# Patient Record
Sex: MALE | Race: WHITE | NOT HISPANIC OR LATINO | Employment: PART TIME | URBAN - METROPOLITAN AREA
[De-identification: names, ages, dates, MRNs, and addresses within clinical notes are randomized per-mention and may not be internally consistent; named-entity substitution may affect disease eponyms.]

---

## 2021-04-12 ENCOUNTER — HOSPITAL ENCOUNTER (OUTPATIENT)
Dept: URGENT CARE | Facility: CLINIC | Age: 41
Discharge: HOME | End: 2021-04-12
Attending: FAMILY MEDICINE

## 2021-07-28 ENCOUNTER — HOSPITAL ENCOUNTER (EMERGENCY)
Facility: HOSPITAL | Age: 41
Discharge: HOME | End: 2021-07-28
Attending: EMERGENCY MEDICINE | Admitting: EMERGENCY MEDICINE

## 2021-07-28 VITALS
OXYGEN SATURATION: 97 % | DIASTOLIC BLOOD PRESSURE: 95 MMHG | SYSTOLIC BLOOD PRESSURE: 154 MMHG | BODY MASS INDEX: 32.14 KG/M2 | TEMPERATURE: 98 F | HEIGHT: 74 IN | WEIGHT: 250.44 LBS | HEART RATE: 102 BPM | RESPIRATION RATE: 18 BRPM

## 2021-07-28 DIAGNOSIS — K04.7 DENTAL ABSCESS: Primary | ICD-10-CM

## 2021-07-28 DIAGNOSIS — K08.89 PAIN, DENTAL: ICD-10-CM

## 2021-07-28 PROCEDURE — 99283 EMERGENCY DEPT VISIT LOW MDM: CPT

## 2021-07-28 RX ORDER — IBUPROFEN 400 MG/1
800 TABLET ORAL ONCE
Status: COMPLETED | OUTPATIENT
Start: 2021-07-28 | End: 2021-07-28

## 2021-07-28 RX ORDER — ACETAMINOPHEN 160 MG/5ML
500 SOLUTION ORAL ONCE
Status: DISCONTINUED | OUTPATIENT
Start: 2021-07-28 | End: 2021-07-28

## 2021-07-28 RX ORDER — LIDOCAINE HYDROCHLORIDE 20 MG/ML
10 SOLUTION OROPHARYNGEAL ONCE
Status: COMPLETED | OUTPATIENT
Start: 2021-07-28 | End: 2021-07-28

## 2021-07-28 RX ORDER — AMOXICILLIN 500 MG/1
1000 CAPSULE ORAL 3 TIMES DAILY
Qty: 30 CAPSULE | Refills: 0 | Status: SHIPPED | OUTPATIENT
Start: 2021-07-28 | End: 2021-12-28

## 2021-07-28 RX ORDER — IBUPROFEN 800 MG/1
800 TABLET ORAL EVERY 6 HOURS PRN
Qty: 30 TABLET | Refills: 0 | Status: SHIPPED | OUTPATIENT
Start: 2021-07-28 | End: 2021-12-28

## 2021-07-28 RX ORDER — ACETAMINOPHEN 160 MG/5ML
650 SOLUTION ORAL ONCE
Status: COMPLETED | OUTPATIENT
Start: 2021-07-28 | End: 2021-07-28

## 2021-07-28 RX ADMIN — LIDOCAINE HYDROCHLORIDE 10 ML: 20 SOLUTION ORAL; TOPICAL at 11:54

## 2021-07-28 RX ADMIN — IBUPROFEN 800 MG: 400 TABLET, FILM COATED ORAL at 11:50

## 2021-07-28 RX ADMIN — ACETAMINOPHEN 650 MG: 160 SOLUTION ORAL at 11:53

## 2021-07-28 RX ADMIN — BENZOCAINE 1 SPRAY: 200 SPRAY DENTAL; ORAL; PERIODONTAL at 11:53

## 2021-12-28 ENCOUNTER — OFFICE VISIT (OUTPATIENT)
Dept: FAMILY MEDICINE CLINIC | Facility: CLINIC | Age: 41
End: 2021-12-28

## 2021-12-28 VITALS
HEIGHT: 74 IN | DIASTOLIC BLOOD PRESSURE: 80 MMHG | TEMPERATURE: 99.8 F | HEART RATE: 75 BPM | SYSTOLIC BLOOD PRESSURE: 122 MMHG | RESPIRATION RATE: 16 BRPM | BODY MASS INDEX: 31.44 KG/M2 | OXYGEN SATURATION: 99 % | WEIGHT: 245 LBS

## 2021-12-28 DIAGNOSIS — F41.9 ANXIETY: ICD-10-CM

## 2021-12-28 DIAGNOSIS — G89.29 CHRONIC PAIN OF BOTH KNEES: ICD-10-CM

## 2021-12-28 DIAGNOSIS — F41.0 PANIC ATTACKS: ICD-10-CM

## 2021-12-28 DIAGNOSIS — Z00.00 ANNUAL PHYSICAL EXAM: ICD-10-CM

## 2021-12-28 DIAGNOSIS — M25.561 CHRONIC PAIN OF BOTH KNEES: ICD-10-CM

## 2021-12-28 DIAGNOSIS — Z76.89 ENCOUNTER TO ESTABLISH CARE: Primary | ICD-10-CM

## 2021-12-28 DIAGNOSIS — M25.562 CHRONIC PAIN OF BOTH KNEES: ICD-10-CM

## 2021-12-28 PROCEDURE — 99203 OFFICE O/P NEW LOW 30 MIN: CPT | Performed by: STUDENT IN AN ORGANIZED HEALTH CARE EDUCATION/TRAINING PROGRAM

## 2021-12-28 PROCEDURE — 99386 PREV VISIT NEW AGE 40-64: CPT | Performed by: STUDENT IN AN ORGANIZED HEALTH CARE EDUCATION/TRAINING PROGRAM

## 2021-12-28 RX ORDER — VENLAFAXINE HYDROCHLORIDE 37.5 MG/1
37.5 CAPSULE, EXTENDED RELEASE ORAL DAILY
Qty: 30 CAPSULE | Refills: 1 | Status: SHIPPED | OUTPATIENT
Start: 2021-12-28 | End: 2022-02-18

## 2021-12-28 RX ORDER — HYDROXYZINE HYDROCHLORIDE 25 MG/1
25 TABLET, FILM COATED ORAL 3 TIMES DAILY PRN
Qty: 30 TABLET | Refills: 1 | Status: SHIPPED | OUTPATIENT
Start: 2021-12-28 | End: 2022-08-15 | Stop reason: SDUPTHER

## 2021-12-28 NOTE — PROGRESS NOTES
"Chief Complaint  Establishing care for anxiety/knee pain and annual physical    Subjective         Asif Rosraio is a 41 y.o. male who presents to [facility]    41 years old male comes to the clinic today for annual physical and talk about worsening anxiety/bilateral chronic knee pain.    Patient currently not taking any medications.  Girlfriend present in the room    Anxiety; chronic history of anxiety, patient has tried multiple medications few years back but no medication use within last 2 years.  Patient does report uncontrolled anxiety especially after Covid and unemployment.  Patient denies any SI/HI, reports good family support.    Patient reports chronic bilateral knee pain, intermittent, work-related.  Denies any weakness/numbness or tingling.  Denies any trauma.    Denies any chest pain or shortness of breath on exertion.  Review of Systems   Objective   Vital Signs:   Vitals:    12/28/21 1030   BP: 122/80   Pulse: 75   Resp: 16   Temp: 99.8 °F (37.7 °C)   SpO2: 99%   Weight: 111 kg (245 lb)   Height: 188 cm (74\")      Body mass index is 31.46 kg/m².   Physical Exam  Vitals reviewed.   Constitutional:       Appearance: Normal appearance. He is well-developed.   HENT:      Head: Normocephalic and atraumatic.      Right Ear: External ear normal.      Left Ear: External ear normal.      Mouth/Throat:      Pharynx: No oropharyngeal exudate.   Eyes:      Conjunctiva/sclera: Conjunctivae normal.      Pupils: Pupils are equal, round, and reactive to light.   Cardiovascular:      Rate and Rhythm: Normal rate and regular rhythm.      Heart sounds: No murmur heard.  No friction rub. No gallop.    Pulmonary:      Effort: Pulmonary effort is normal.      Breath sounds: Normal breath sounds. No wheezing or rhonchi.   Abdominal:      General: Bowel sounds are normal. There is no distension.      Palpations: Abdomen is soft.      Tenderness: There is no abdominal tenderness.   Skin:     " General: Skin is warm and dry.   Neurological:      Mental Status: He is alert and oriented to person, place, and time.      Cranial Nerves: No cranial nerve deficit.   Psychiatric:         Mood and Affect: Mood and affect normal.         Behavior: Behavior normal.         Thought Content: Thought content normal.         Judgment: Judgment normal.                 Assessment and Plan   Diagnoses and all orders for this visit:    1. Encounter to establish care (Primary)  -     TSH Rfx On Abnormal To Free T4; Future  -     CBC & Differential; Future  -     Comprehensive Metabolic Panel; Future  -     Hemoglobin A1c; Future  -     Lipid Panel; Future    2. Annual physical exam  Comments:  Declined all the vaccinations.  Healthy diet and daily exercise discussed.  Orders:  -     TSH Rfx On Abnormal To Free T4; Future  -     CBC & Differential; Future  -     Comprehensive Metabolic Panel; Future  -     Hemoglobin A1c; Future  -     Lipid Panel; Future    3. Anxiety  Comments:  Has tried SSRI in the past.  Will try Effexor/hydroxyzine as needed, titrating up protocol discussed including side effects  Orders:  -     venlafaxine XR (Effexor XR) 37.5 MG 24 hr capsule; Take 1 capsule by mouth Daily.  Dispense: 30 capsule; Refill: 1  -     hydrOXYzine (ATARAX) 25 MG tablet; Take 1 tablet by mouth 3 (Three) Times a Day As Needed for Anxiety.  Dispense: 30 tablet; Refill: 1  -     TSH Rfx On Abnormal To Free T4; Future  -     CBC & Differential; Future  -     Comprehensive Metabolic Panel; Future  -     Hemoglobin A1c; Future  -     Lipid Panel; Future    4. Chronic pain of both knees  Comments:  Knee brace, home physical therapy discussed, work modifications and diclofenac reviewed/prescribed  Orders:  -     diclofenac (VOLTAREN) 50 MG EC tablet; Take 1 tablet by mouth 2 (Two) Times a Day As Needed (knee pain).  Dispense: 30 tablet; Refill: 2    5. Panic attacks  Comments:  Hydroxyzine as needed      Healthy diet and daily  exercise discussed.      Follow Up   Return in about 3 months (around 3/28/2022) for Recheck.  Patient was given instructions and counseling regarding his condition or for health maintenance advice. Please see specific information pulled into the AVS if appropriate.

## 2022-01-27 PROCEDURE — U0004 COV-19 TEST NON-CDC HGH THRU: HCPCS | Performed by: FAMILY MEDICINE

## 2022-01-28 ENCOUNTER — TELEPHONE (OUTPATIENT)
Dept: URGENT CARE | Facility: CLINIC | Age: 42
End: 2022-01-28

## 2022-02-08 ENCOUNTER — APPOINTMENT (OUTPATIENT)
Dept: GENERAL RADIOLOGY | Facility: HOSPITAL | Age: 42
End: 2022-02-08

## 2022-02-08 ENCOUNTER — HOSPITAL ENCOUNTER (EMERGENCY)
Facility: HOSPITAL | Age: 42
Discharge: HOME | End: 2022-02-08
Attending: EMERGENCY MEDICINE | Admitting: EMERGENCY MEDICINE

## 2022-02-08 VITALS
BODY MASS INDEX: 31.63 KG/M2 | HEIGHT: 75 IN | OXYGEN SATURATION: 98 % | RESPIRATION RATE: 20 BRPM | HEART RATE: 105 BPM | TEMPERATURE: 98 F | SYSTOLIC BLOOD PRESSURE: 131 MMHG | DIASTOLIC BLOOD PRESSURE: 86 MMHG | WEIGHT: 254.41 LBS

## 2022-02-08 DIAGNOSIS — S39.012A STRAIN OF LUMBAR REGION, INITIAL ENCOUNTER: ICD-10-CM

## 2022-02-08 DIAGNOSIS — S29.012A STRAIN OF MID-BACK, INITIAL ENCOUNTER: ICD-10-CM

## 2022-02-08 DIAGNOSIS — S16.1XXA STRAIN OF NECK MUSCLE, INITIAL ENCOUNTER: Primary | ICD-10-CM

## 2022-02-08 PROCEDURE — 99283 EMERGENCY DEPT VISIT LOW MDM: CPT

## 2022-02-08 PROCEDURE — 72100 X-RAY EXAM L-S SPINE 2/3 VWS: CPT

## 2022-02-08 PROCEDURE — 72072 X-RAY EXAM THORAC SPINE 3VWS: CPT

## 2022-02-08 PROCEDURE — 72050 X-RAY EXAM NECK SPINE 4/5VWS: CPT

## 2022-02-08 RX ORDER — IBUPROFEN 400 MG/1
800 TABLET ORAL ONCE
Status: COMPLETED | OUTPATIENT
Start: 2022-02-08 | End: 2022-02-08

## 2022-02-08 RX ADMIN — IBUPROFEN 800 MG: 400 TABLET, FILM COATED ORAL at 18:11

## 2022-02-08 NOTE — ED PROVIDER NOTES
Subjective   Pt states he had a work place injury a few days ago. 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  He states now as of today, has had neck, mid and lower back pain. Wants to have imaging done.       History provided by:  Patient  Fall  Mechanism of injury: fall    Injury location:  Head/neck (back)  Incident location:  Around machinery  Time since incident:  3 days  Associated symptoms: back pain and neck pain        Review of Systems   Constitutional: Negative for appetite change, chills, fatigue and fever.   HENT: Negative.    Eyes: Negative.  Negative for photophobia.   Respiratory: Negative.    Gastrointestinal: Negative.    Endocrine: Negative.    Genitourinary: Negative.    Musculoskeletal: Positive for back pain and neck pain.   Skin: Negative.    Allergic/Immunologic: Negative.  Negative for immunocompromised state.   Neurological: Negative.    Hematological: Negative.    Psychiatric/Behavioral: Negative.    All other systems reviewed and are negative.      Past Medical History:   Diagnosis Date   • Anxiety    • Depression    • Forgetfulness    • GERD (gastroesophageal reflux disease)    • Hemorrhoids    • Migraines    • Night sweats        No Known Allergies    History reviewed. No pertinent surgical history.    Family History   Problem Relation Age of Onset   • No Known Problems Mother    • No Known Problems Father        Social History     Socioeconomic History   • Marital status: Legally    Tobacco Use   • Smoking status: Current Every Day Smoker     Packs/day: 1.00     Years: 15.00     Pack years: 15.00   • Smokeless tobacco: Never Used   Vaping Use   • Vaping Use: Never used   Substance and Sexual Activity   • Alcohol use: Not Currently   • Drug use: Never   • Sexual activity: Yes     Partners: Female     Birth control/protection: Condom           Objective   Physical Exam  Vitals and  nursing note reviewed.   Constitutional:       General: He is not in acute distress.     Appearance: Normal appearance. He is not toxic-appearing.   HENT:      Head: Normocephalic and atraumatic.      Mouth/Throat:      Mouth: Mucous membranes are moist.   Eyes:      General: No scleral icterus.  Cardiovascular:      Rate and Rhythm: Normal rate and regular rhythm.      Pulses: Normal pulses.      Heart sounds: Normal heart sounds.   Pulmonary:      Effort: Pulmonary effort is normal. No respiratory distress.      Breath sounds: Normal breath sounds.   Musculoskeletal:         General: Normal range of motion.      Cervical back: Normal range of motion and neck supple.        Back:       Comments: Pain along all paraspinal tenderness on all levels  No saddle anesthesia   Skin:     General: Skin is warm and dry.   Neurological:      Mental Status: He is alert and oriented to person, place, and time. Mental status is at baseline.           MDM  Number of Diagnoses or Management Options  Strain of lumbar region, initial encounter  Strain of mid-back, initial encounter  Strain of neck muscle, initial encounter  Diagnosis management comments: Pt is a 42 y.o. male that presents today with Patient presents with:  Neck Pain       Work up today:  Lab Results (last 24 hours)     ** No results found for the last 24 hours. **      XR Spine Thoracic 3 View    Result Date: 2/8/2022  PROCEDURE: XR SPINE THORACIC 3 VW  COMPARISON: None  INDICATIONS: MIDDLE BACK PAIN/ WORK INJURY  FINDINGS:  Thoracic vertebral bodies have normal height.  Alignment is preserved.  Mild degenerative changes.       Mild degenerative change.  No acute findings     WILLIS MARVIN MD       Electronically Signed and Approved By: WILLIS MARVIN MD on 2/08/2022 at 17:57             XR Spine Lumbar 2 or 3 View    Result Date: 2/8/2022  PROCEDURE: XR SPINE LUMBAR 2 OR 3 VW  COMPARISON: None  INDICATIONS: LUMBAR PAIN/ WORK INJURY  FINDINGS:  Lumbar bodies have normal  height.  Alignment is preserved.  Disc spaces are preserved.       No acute finding     WILLIS MARVIN MD       Electronically Signed and Approved By: WILLIS MARVIN MD on 2/08/2022 at 17:58              @No orders to display       The patient will pursue further outpatient evaluation with the primary care physician or other designated or consulting physician as outlined in the discharge instructions. They are agreeable to this plan of care and follow-up instructions have been explained in detail. The patient has received these instructions in written format and have expressed an understanding of the discharge instructions. The patient is aware that any significant change in condition or worsening of symptoms should prompt an immediate return to this or the closest emergency department or call to 911.  Pt is otherwise non toxic and non ill appearing and stable for d/c home.  Pt is in agreement with this.  All questions answered at bedside.          Amount and/or Complexity of Data Reviewed  Tests in the radiology section of CPT®: reviewed    Risk of Complications, Morbidity, and/or Mortality  Presenting problems: moderate  Diagnostic procedures: moderate  Management options: moderate    Patient Progress  Patient progress: stable      Final diagnoses:   Strain of neck muscle, initial encounter   Strain of mid-back, initial encounter   Strain of lumbar region, initial encounter       ED Disposition  ED Disposition     ED Disposition Condition Comment    Discharge Stable           Adonis Mckeon MD  [address] [facility] [address]  [facility] [id number] [phone]               Medication List      No changes were made to your prescriptions during this visit.          Johnny Bernstein PA  02/08/22 [id number]

## 2022-02-18 ENCOUNTER — LAB (OUTPATIENT)
Dept: LAB | Facility: HOSPITAL | Age: 42
End: 2022-02-18

## 2022-02-18 ENCOUNTER — TELEPHONE (OUTPATIENT)
Dept: FAMILY MEDICINE CLINIC | Facility: CLINIC | Age: 42
End: 2022-02-18

## 2022-02-18 ENCOUNTER — OFFICE VISIT (OUTPATIENT)
Dept: FAMILY MEDICINE CLINIC | Facility: CLINIC | Age: 42
End: 2022-02-18

## 2022-02-18 VITALS
WEIGHT: 256.6 LBS | OXYGEN SATURATION: 98 % | RESPIRATION RATE: 18 BRPM | HEIGHT: 75 IN | DIASTOLIC BLOOD PRESSURE: 92 MMHG | HEART RATE: 73 BPM | SYSTOLIC BLOOD PRESSURE: 140 MMHG | BODY MASS INDEX: 31.9 KG/M2 | TEMPERATURE: 96.2 F

## 2022-02-18 DIAGNOSIS — F41.9 ANXIETY: ICD-10-CM

## 2022-02-18 DIAGNOSIS — Z00.00 ANNUAL PHYSICAL EXAM: ICD-10-CM

## 2022-02-18 DIAGNOSIS — Z76.89 ENCOUNTER TO ESTABLISH CARE: ICD-10-CM

## 2022-02-18 DIAGNOSIS — F41.0 PANIC ATTACK: ICD-10-CM

## 2022-02-18 DIAGNOSIS — F41.9 ANXIETY: Primary | ICD-10-CM

## 2022-02-18 LAB
ALBUMIN SERPL-MCNC: 4.9 G/DL (ref 3.5–5.2)
ALBUMIN/GLOB SERPL: 1.6 G/DL
ALP SERPL-CCNC: 61 U/L (ref 39–117)
ALT SERPL W P-5'-P-CCNC: 44 U/L (ref 1–41)
AMPHET+METHAMPHET UR QL: NEGATIVE
AMPHETAMINE INTERNAL CONTROL: NORMAL
AMPHETAMINES UR QL: NEGATIVE
ANION GAP SERPL CALCULATED.3IONS-SCNC: 11 MMOL/L (ref 5–15)
AST SERPL-CCNC: 28 U/L (ref 1–40)
BARBITURATE INTERNAL CONTROL: NORMAL
BARBITURATES UR QL SCN: NEGATIVE
BASOPHILS # BLD AUTO: 0.09 10*3/MM3 (ref 0–0.2)
BASOPHILS NFR BLD AUTO: 0.8 % (ref 0–1.5)
BENZODIAZ UR QL SCN: NEGATIVE
BENZODIAZEPINE INTERNAL CONTROL: NORMAL
BILIRUB SERPL-MCNC: 0.4 MG/DL (ref 0–1.2)
BUN SERPL-MCNC: 15 MG/DL (ref 6–20)
BUN/CREAT SERPL: 17.4 (ref 7–25)
BUPRENORPHINE INTERNAL CONTROL: NORMAL
BUPRENORPHINE SERPL-MCNC: NEGATIVE NG/ML
CALCIUM SPEC-SCNC: 9.1 MG/DL (ref 8.6–10.5)
CANNABINOIDS SERPL QL: NEGATIVE
CHLORIDE SERPL-SCNC: 103 MMOL/L (ref 98–107)
CHOLEST SERPL-MCNC: 239 MG/DL (ref 0–200)
CO2 SERPL-SCNC: 24 MMOL/L (ref 22–29)
COCAINE INTERNAL CONTROL: NORMAL
COCAINE UR QL: NEGATIVE
CREAT SERPL-MCNC: 0.86 MG/DL (ref 0.76–1.27)
DEPRECATED RDW RBC AUTO: 41.6 FL (ref 37–54)
EOSINOPHIL # BLD AUTO: 0.06 10*3/MM3 (ref 0–0.4)
EOSINOPHIL NFR BLD AUTO: 0.5 % (ref 0.3–6.2)
ERYTHROCYTE [DISTWIDTH] IN BLOOD BY AUTOMATED COUNT: 12.8 % (ref 12.3–15.4)
EXPIRATION DATE: NORMAL
GFR SERPL CREATININE-BSD FRML MDRD: 98 ML/MIN/1.73
GLOBULIN UR ELPH-MCNC: 3 GM/DL
GLUCOSE SERPL-MCNC: 97 MG/DL (ref 65–99)
HCT VFR BLD AUTO: 52.6 % (ref 37.5–51)
HDLC SERPL-MCNC: 37 MG/DL (ref 40–60)
HGB BLD-MCNC: 17.4 G/DL (ref 13–17.7)
IMM GRANULOCYTES # BLD AUTO: 0.12 10*3/MM3 (ref 0–0.05)
IMM GRANULOCYTES NFR BLD AUTO: 1.1 % (ref 0–0.5)
LDLC SERPL CALC-MCNC: 160 MG/DL (ref 0–100)
LDLC/HDLC SERPL: 4.23 {RATIO}
LYMPHOCYTES # BLD AUTO: 3.11 10*3/MM3 (ref 0.7–3.1)
LYMPHOCYTES NFR BLD AUTO: 27.8 % (ref 19.6–45.3)
Lab: NORMAL
MCH RBC QN AUTO: 29.6 PG (ref 26.6–33)
MCHC RBC AUTO-ENTMCNC: 33.1 G/DL (ref 31.5–35.7)
MCV RBC AUTO: 89.6 FL (ref 79–97)
MDMA (ECSTASY) INTERNAL CONTROL: NORMAL
MDMA UR QL SCN: NEGATIVE
METHADONE INTERNAL CONTROL: NORMAL
METHADONE UR QL SCN: NEGATIVE
METHAMPHETAMINE INTERNAL CONTROL: NORMAL
MONOCYTES # BLD AUTO: 0.43 10*3/MM3 (ref 0.1–0.9)
MONOCYTES NFR BLD AUTO: 3.8 % (ref 5–12)
NEUTROPHILS NFR BLD AUTO: 66 % (ref 42.7–76)
NEUTROPHILS NFR BLD AUTO: 7.39 10*3/MM3 (ref 1.7–7)
NRBC BLD AUTO-RTO: 0 /100 WBC (ref 0–0.2)
OPIATES INTERNAL CONTROL: NORMAL
OPIATES UR QL: NEGATIVE
OXYCODONE INTERNAL CONTROL: NORMAL
OXYCODONE UR QL SCN: NEGATIVE
PCP UR QL SCN: NEGATIVE
PHENCYCLIDINE INTERNAL CONTROL: NORMAL
PLATELET # BLD AUTO: 263 10*3/MM3 (ref 140–450)
PMV BLD AUTO: 9.9 FL (ref 6–12)
POTASSIUM SERPL-SCNC: 3.9 MMOL/L (ref 3.5–5.2)
PROT SERPL-MCNC: 7.9 G/DL (ref 6–8.5)
RBC # BLD AUTO: 5.87 10*6/MM3 (ref 4.14–5.8)
SODIUM SERPL-SCNC: 138 MMOL/L (ref 136–145)
THC INTERNAL CONTROL: NORMAL
TRIGL SERPL-MCNC: 227 MG/DL (ref 0–150)
TSH SERPL DL<=0.05 MIU/L-ACNC: 2.08 UIU/ML (ref 0.27–4.2)
VLDLC SERPL-MCNC: 42 MG/DL (ref 5–40)
WBC NRBC COR # BLD: 11.2 10*3/MM3 (ref 3.4–10.8)

## 2022-02-18 PROCEDURE — 85025 COMPLETE CBC W/AUTO DIFF WBC: CPT

## 2022-02-18 PROCEDURE — 83036 HEMOGLOBIN GLYCOSYLATED A1C: CPT

## 2022-02-18 PROCEDURE — 36415 COLL VENOUS BLD VENIPUNCTURE: CPT

## 2022-02-18 PROCEDURE — 80061 LIPID PANEL: CPT

## 2022-02-18 PROCEDURE — 80305 DRUG TEST PRSMV DIR OPT OBS: CPT | Performed by: STUDENT IN AN ORGANIZED HEALTH CARE EDUCATION/TRAINING PROGRAM

## 2022-02-18 PROCEDURE — 84443 ASSAY THYROID STIM HORMONE: CPT

## 2022-02-18 PROCEDURE — 99214 OFFICE O/P EST MOD 30 MIN: CPT | Performed by: STUDENT IN AN ORGANIZED HEALTH CARE EDUCATION/TRAINING PROGRAM

## 2022-02-18 PROCEDURE — 80053 COMPREHEN METABOLIC PANEL: CPT

## 2022-02-18 RX ORDER — CLONAZEPAM 0.5 MG/1
0.5 TABLET ORAL NIGHTLY PRN
Qty: 30 TABLET | Refills: 0 | Status: SHIPPED | OUTPATIENT
Start: 2022-02-18 | End: 2022-03-21

## 2022-02-18 RX ORDER — VENLAFAXINE HYDROCHLORIDE 75 MG/1
75 CAPSULE, EXTENDED RELEASE ORAL DAILY
Qty: 90 CAPSULE | Refills: 0 | Status: SHIPPED | OUTPATIENT
Start: 2022-02-18

## 2022-02-18 NOTE — PROGRESS NOTES
"Chief Complaint  Anxiety/panic attacks    Subjective         Asif Rosario is a 42 y.o. male who presents to [facility]    42 years old male comes to the clinic today for an acute visit.  Patient was scheduled today for the appointment after mom called complaining of panic attack-like symptoms at home.  Patient does have a history of chronic anxiety and panic attacks, patient reports that it has gotten worse recently especially after accident at work.  Patient had accident at work about 10 days ago where he fractured his left wrist and since then his panic attack has gotten worse as per patient.  Patient was started on Effexor and hydroxyzine by me in last visit.  Patient reports Effexor has helped somewhat but hydroxyzine is not helping at all with his panic attacks.  Patient does report getting 1-2 panic attacks every 2 to 3 days.  Reports feeling heart racing/sweating/scared.  Denies any history of any heart or lung disease.      Patient does report that long time ago he had panic attack at work and he was given half tablet of Klonopin by colleague which worked really well on him.  He has not used any prescribed controlled or any illegal substances recently.    Review of Systems   Objective   Vital Signs:   Vitals:    02/18/22 1501   BP: 140/92   BP Location: Right arm   Patient Position: Sitting   Cuff Size: Adult   Pulse: 73   Resp: 18   Temp: 96.2 °F (35.7 °C)   TempSrc: Temporal   SpO2: 98%   Weight: 116 kg (256 lb 9.6 oz)   Height: 190.5 cm (75\")      Body mass index is 32.07 kg/m².   Physical Exam  Vitals reviewed.   Constitutional:       Appearance: Normal appearance. He is well-developed.   HENT:      Head: Normocephalic and atraumatic.      Right Ear: External ear normal.      Left Ear: External ear normal.      Mouth/Throat:      Pharynx: No oropharyngeal exudate.   Eyes:      Conjunctiva/sclera: Conjunctivae normal.      Pupils: Pupils are equal, round, and reactive " to light.   Cardiovascular:      Rate and Rhythm: Normal rate and regular rhythm.      Heart sounds: No murmur heard.  No friction rub. No gallop.    Pulmonary:      Effort: Pulmonary effort is normal.      Breath sounds: Normal breath sounds. No wheezing or rhonchi.   Abdominal:      General: Bowel sounds are normal. There is no distension.      Palpations: Abdomen is soft.      Tenderness: There is no abdominal tenderness.   Skin:     General: Skin is warm and dry.   Neurological:      Mental Status: He is alert and oriented to person, place, and time.      Cranial Nerves: No cranial nerve deficit.   Psychiatric:         Mood and Affect: Mood and affect normal.         Behavior: Behavior normal.         Thought Content: Thought content normal.         Judgment: Judgment normal.                 Assessment and Plan   Diagnoses and all orders for this visit:    1. Anxiety (Primary)  Comments:  Will increase Effexor  Orders:  -     venlafaxine XR (Effexor XR) 75 MG 24 hr capsule; Take 1 capsule by mouth Daily.  Dispense: 90 capsule; Refill: 0  -     POC Urine Drug Screen [facility]  -     clonazePAM (KlonoPIN) 0.5 MG tablet; Take 1 tablet by mouth At Night As Needed for Anxiety.  Dispense: 30 tablet; Refill: 0    2. Panic attack  Comments:  Start Klonopin as needed.  Controlled substance contract reviewed and discussed, will be monitoring him closely for his Klonopin use.  Orders:  -     venlafaxine XR (Effexor XR) 75 MG 24 hr capsule; Take 1 capsule by mouth Daily.  Dispense: 90 capsule; Refill: 0  -     POC Urine Drug Screen [facility]  -     clonazePAM (KlonoPIN) 0.5 MG tablet; Take 1 tablet by mouth At Night As Needed for Anxiety.  Dispense: 30 tablet; Refill: 0    Pranav reviewed/urine drug screening reviewed today  (off note; initial sample did not pass controlled so repeat urine drug screening was performed which was negative for all the substances)    I have instructed patient to use 30 tablets for  next 60 days as he described maybe 3-4 episodes per week.  I will be strictly monitoring his controlled substance use, if any sign of abuse/dependency, if patient will be using more often; I will be stopping his prescription and will refer him to psych.  Patient understands and agrees with the plan, patient understands the contract and agrees.          Follow Up   Return if symptoms worsen or fail to improve.  Patient was given instructions and counseling regarding his condition or for health maintenance advice. Please see specific information pulled into the AVS if appropriate.

## 2022-02-18 NOTE — TELEPHONE ENCOUNTER
"\"RED FLAG\" WORD     Caller: Asif Rosario    Relationship: Self    Best call back number: [phone]    What is the best time to reach you: ANYTIME    Who are you requesting to speak with (clinical staff, provider,  specific staff member): CLINICAL     What was the call regarding: PATIENT STATES THAT HE IS HAVE SEVERE ANXIETY ATTACKS. HE STATES THE MEDICATION IS NOT WORKING. PATIENTS MOTHER GOT ON THE PHONE AND STATES THAT THE PATIENT CANT MANAGE HIS THOUGHTS AND HE IS HAVING A HARD TIME BREATHING LIKE SOMEONE IS SITTING ON HIS CHEST. HUB ADVISED ED . MOTHER VERBALIZED UNDERSTANDING AND THEY PLAN TO GO TO THE ED.     Do you require a callback: YES         "

## 2022-02-19 LAB — HBA1C MFR BLD: 5.5 % (ref 4.8–5.6)

## 2022-02-22 ENCOUNTER — OFFICE VISIT (OUTPATIENT)
Dept: ORTHOPEDIC SURGERY | Facility: CLINIC | Age: 42
End: 2022-02-22

## 2022-02-22 VITALS — HEIGHT: 75 IN | WEIGHT: 250 LBS | BODY MASS INDEX: 31.08 KG/M2

## 2022-02-22 DIAGNOSIS — S69.92XD INJURY OF LEFT WRIST, SUBSEQUENT ENCOUNTER: Primary | ICD-10-CM

## 2022-02-22 PROBLEM — S69.92XA INJURY OF LEFT WRIST: Status: ACTIVE | Noted: 2022-02-22

## 2022-02-22 PROCEDURE — 99203 OFFICE O/P NEW LOW 30 MIN: CPT | Performed by: ORTHOPAEDIC SURGERY

## 2022-02-22 RX ORDER — IBUPROFEN 800 MG/1
800 TABLET ORAL EVERY 8 HOURS PRN
Qty: 90 TABLET | Refills: 1 | Status: SHIPPED | OUTPATIENT
Start: 2022-02-22 | End: 2022-03-31 | Stop reason: SDUPTHER

## 2022-02-22 NOTE — PROGRESS NOTES
"Chief Complaint  Pain of the Left Wrist     Subjective      Asif Rosario presents to [facility] for evaluation of the left wrist. He reports he was working and a  backed up and hit his conveyor belt causing him to injury his left wrist on 2/7/2022. He denies a previous injury to that wrist in the past. He reports his wrist pain has improved some since the initial injury. He locates his pain over the ulnar wrist. He has no other complaints.     No Known Allergies     Social History     Socioeconomic History   • Marital status: Legally    Tobacco Use   • Smoking status: Current Every Day Smoker     Packs/day: 1.00     Years: 15.00     Pack years: 15.00   • Smokeless tobacco: Never Used   Vaping Use   • Vaping Use: Never used   Substance and Sexual Activity   • Alcohol use: Not Currently   • Drug use: Never   • Sexual activity: Yes     Partners: Female     Birth control/protection: Condom        Review of Systems     Objective   Vital Signs:   Ht 190.5 cm (75\")   Wt 113 kg (250 lb)   BMI 31.25 kg/m²       Physical Exam  Constitutional:       Appearance: Normal appearance. The patient is well-developed and normal weight.   HENT:      Head: Normocephalic.      Right Ear: Hearing and external ear normal.      Left Ear: Hearing and external ear normal.      Nose: Nose normal.   Eyes:      Conjunctiva/sclera: Conjunctivae normal.   Cardiovascular:      Rate and Rhythm: Normal rate.   Pulmonary:      Effort: Pulmonary effort is normal.      Breath sounds: No wheezing or rales.   Abdominal:      Palpations: Abdomen is soft.      Tenderness: There is no abdominal tenderness.   Musculoskeletal:      Cervical back: Normal range of motion.   Skin:     Findings: No rash.   Neurological:      Mental Status: The patient is alert and oriented to person, place, and time.   Psychiatric:         Mood and Affect: Mood and affect normal.         Judgment: Judgment normal.       Ortho " Exam      Left wrist- tender to the dorsum of the wrist and ulnar wrist. Extension 30. Flexion 45. Supination -5. Pronation full. Sensation to light touch median, radial, ulnar nerve. Positive AIN, PIN, ulnar nerve. Positive pulses. Limited strength in hadn secondary to pain. Unable to make full fist.     Procedures      Imaging Results (Most Recent)     None           Result Review :       XR Spine Cervical Complete 4 or 5 View    Result Date: 2/8/2022  Narrative: PROCEDURE: XR SPINE CERVICAL COMPLETE 4 OR 5 VW  COMPARISON: None  INDICATIONS: injury, posterior neck pain after fall  FINDINGS:  No fracture or malalignment is demonstrated.  Prevertebral soft tissues appear normal.  The neural foramina appear widely patent bilaterally.  Mild degenerative disc changes are noted in the lower cervical spine.      Impression:   1. No acute fracture or malalignment     Jacoby Barraza M.D.       Electronically Signed and Approved By: Jacoby Barraza M.D. on 2/08/2022 at 16:40             XR Spine Thoracic 3 View    Result Date: 2/8/2022  Narrative: PROCEDURE: XR SPINE THORACIC 3 VW  COMPARISON: None  INDICATIONS: MIDDLE BACK PAIN/ WORK INJURY  FINDINGS:  Thoracic vertebral bodies have normal height.  Alignment is preserved.  Mild degenerative changes.      Impression:  Mild degenerative change.  No acute findings     WILLIS MARVIN MD       Electronically Signed and Approved By: WILLIS MARVIN MD on 2/08/2022 at 17:57             XR Spine Lumbar 2 or 3 View    Result Date: 2/8/2022  Narrative: PROCEDURE: XR SPINE LUMBAR 2 OR 3 VW  COMPARISON: None  INDICATIONS: LUMBAR PAIN/ WORK INJURY  FINDINGS:  Lumbar bodies have normal height.  Alignment is preserved.  Disc spaces are preserved.      Impression:  No acute finding     WILLIS MARVIN MD       Electronically Signed and Approved By: WILLIS MARVIN MD on 2/08/2022 at 17:58             XR Wrist 3+ View Left    Result Date: 2/7/2022  Narrative: PROCEDURE: XR WRIST 3+ VW LEFT   COMPARISON: None  INDICATIONS: Work Injury  FINDINGS:  There is no acute fracture or dislocation.  There is a well corticated bony density which appears to be secondary to an old dorsal triquetrum fracture.  The joint spaces are well maintained.  The soft tissues are unremarkable.      Impression:   1. No acute findings. 2. Findings suggestive of an old dorsal triquetrum fracture.      JSESICA SESAY MD       Electronically Signed and Approved By: JESSICA SESAY MD on 2/07/2022 at 16:50             XR Hand 3+ View Left    Result Date: 2/7/2022  Narrative: PROCEDURE: XR HAND 3+ VW LEFT  COMPARISON: None  INDICATIONS: Work Injury  FINDINGS:  There is no acute fracture or dislocation.  There is a well corticated bony density consistent with an old non-united dorsal triquetrum fracture.  The joint spaces are well maintained.  The soft tissues are unremarkable.      Impression:  No acute findings.      JESSICA SESAY MD       Electronically Signed and Approved By: JESSICA SESAY MD on 2/07/2022 at 16:51                      Assessment and Plan     DX: Left wrist injury    Discussed the treatment plan with the patient.  Plan to continue the wrist brace. Prescription for ibuprofen given today. Work note given today with restrictions.     Call or return if worsening symptoms.    Follow Up     4 weeks.       Patient was given instructions and counseling regarding his condition or for health maintenance advice. Please see specific information pulled into the AVS if appropriate.     Scribed for Conrad Sood MD by Collette Perez.  02/22/22   09:46 EST    I have personally performed the services described in this document as scribed by the above individual and it is both accurate and complete. Conrad Sood MD 02/22/22

## 2022-03-01 ENCOUNTER — TELEPHONE (OUTPATIENT)
Dept: ORTHOPEDIC SURGERY | Facility: CLINIC | Age: 42
End: 2022-03-01

## 2022-03-01 NOTE — TELEPHONE ENCOUNTER
Caller: MAKI    Relationship: [employer]    Best call back number: [phone]    What form or medical record are you requesting: OFFICE NOTES AND WORK STATUS FROM PATIENT'S APPOINTMENT ON 02.22.22    Who is requesting this form or medical record from you: MARTIN     How would you like to receive the form or medical records (pick-up, mail, fax):   If fax, what is the fax number: [phone]      Timeframe paperwork needed: ASAP    Additional notes: MAKI WITH [employer] WAS CALLING TO REQUEST THE OFFICE NOTES AND WORK STATUS FROM PATIENT'S APPOINTMENT ON 02.22.22. MAKI WOULD LIKE TO RECEIVE THAT INFORMATION BY FAX ASAP. THANK YOU!

## 2022-03-04 NOTE — TELEPHONE ENCOUNTER
LEFT V/M FOR MAKI @ [place] TO RETURN MY CALL AND ALSO TRIED TO CONTACT PATIENT BUT THE PHONE NUMBER LISTED IS NOT A WORKING NUMBER.

## 2022-03-08 ENCOUNTER — TELEPHONE (OUTPATIENT)
Dept: ORTHOPEDIC SURGERY | Facility: CLINIC | Age: 42
End: 2022-03-08

## 2022-03-21 DIAGNOSIS — F41.9 ANXIETY: ICD-10-CM

## 2022-03-21 DIAGNOSIS — F41.0 PANIC ATTACK: ICD-10-CM

## 2022-03-21 RX ORDER — CLONAZEPAM 0.5 MG/1
TABLET ORAL
Qty: 30 TABLET | Refills: 0 | Status: SHIPPED | OUTPATIENT
Start: 2022-03-21 | End: 2022-04-15

## 2022-03-31 ENCOUNTER — OFFICE VISIT (OUTPATIENT)
Dept: ORTHOPEDIC SURGERY | Facility: CLINIC | Age: 42
End: 2022-03-31

## 2022-03-31 VITALS — HEART RATE: 69 BPM | OXYGEN SATURATION: 97 % | HEIGHT: 75 IN | WEIGHT: 250 LBS | BODY MASS INDEX: 31.08 KG/M2

## 2022-03-31 DIAGNOSIS — S69.92XD INJURY OF LEFT WRIST, SUBSEQUENT ENCOUNTER: Primary | ICD-10-CM

## 2022-03-31 PROCEDURE — 99213 OFFICE O/P EST LOW 20 MIN: CPT | Performed by: PHYSICIAN ASSISTANT

## 2022-03-31 RX ORDER — IBUPROFEN 800 MG/1
800 TABLET ORAL EVERY 8 HOURS PRN
Qty: 90 TABLET | Refills: 1 | Status: SHIPPED | OUTPATIENT
Start: 2022-03-31 | End: 2022-08-15 | Stop reason: SDUPTHER

## 2022-03-31 NOTE — PROGRESS NOTES
"Chief Complaint  Pain of the Left Wrist    Subjective          Asif Rosario is a 42 y.o. male  presents to [facility] for   History of Present Illness    Patient presents with his kathleen Beckford for follow-up evaluation of left wrist pain/injury.  He was seen on 2/22/2022 by Dr. Sood his original injury occurred on 2/7/2022.  There is a work comp injury.  He had x-rays, Dr. Sood diagnosed the patient with left wrist injury.  Patient has been in his wrist brace since the last visit he has been taking ibuprofen he has remained off of work since work does not have light duty.  He states that the wrist \"feels better \".  He states he use the brace with everything he states he was given a prescription of ibuprofen at last visit but he did not pick it up.  He tried working but they did not have 1 arm/light duty.  He states the pain is in the dorsum of his wrist.  No Known Allergies     Social History     Socioeconomic History   • Marital status: Legally    Tobacco Use   • Smoking status: Current Every Day Smoker     Packs/day: 1.00     Years: 15.00     Pack years: 15.00   • Smokeless tobacco: Never Used   Vaping Use   • Vaping Use: Never used   Substance and Sexual Activity   • Alcohol use: Not Currently   • Drug use: Never   • Sexual activity: Yes     Partners: Female     Birth control/protection: Condom        REVIEW OF SYSTEMS    Constitutional: Denies fevers, chills, weight loss  Cardiovascular: Denies chest pain, shortness of breath  Skin: Denies rashes, acute skin changes  Neurologic: Denies headache, loss of consciousness  MSK: Left wrist pain      Objective   Vital Signs:   Pulse 69   Ht 190.5 cm (75\")   Wt 113 kg (250 lb)   SpO2 97%   BMI 31.25 kg/m²     Body mass index is 31.25 kg/m².    Physical Exam    Left wrist: Skin is intact, no erythema, no ecchymosis, no swelling, no signs of infection, tenderness palpation of the dorsum of the wrist, range of " motion limited secondary to stiffness.  Patient will wiggle fingers and thumb, makes a full fist, sensation intact light touch.    Procedures    Imaging Results (Most Recent)     None           Result Review :   The following data was reviewed by: CHRISTY Burrell on 03/31/2022:               Assessment and Plan    Diagnoses and all orders for this visit:    1. Injury of left wrist, subsequent encounter (Primary)  -     ibuprofen (ADVIL,MOTRIN) 800 MG tablet; Take 1 tablet by mouth Every 8 (Eight) Hours As Needed for Mild Pain .  Dispense: 90 tablet; Refill: 1  -     Ambulatory Referral to Physical Therapy Evaluate and treat (2-3x/week for 6-8 weeks), Ortho        Discussed diagnosis and treatment options with patient and his fiancée, patient was given prescription for ibuprofen which he states he will now  he was given work note to remain off of work since there is no light duty, patient will start physical therapy and wean out of his brace, follow-up in 6 weeks for reevaluation.    Call or return if worsening symptoms.    Follow Up   Return in about 6 weeks (around 5/12/2022) for Recheck.  Patient was given instructions and counseling regarding his condition or for health maintenance advice. Please see specific information pulled into the AVS if appropriate.

## 2022-04-01 ENCOUNTER — TELEPHONE (OUTPATIENT)
Dept: ORTHOPEDIC SURGERY | Facility: CLINIC | Age: 42
End: 2022-04-01

## 2022-04-01 NOTE — TELEPHONE ENCOUNTER
Provider: POPEYE HARRISON    Caller: RAFAL - COORDINATOR WITH [employer] IS CALLING ON BEHALF OF NURSE  MAKI OLIVARES    Relationship to Patient: W/C GABRIELA COORDINATOR    Phone Number: [phone]    Reason for Call: NEEDING LAST OFFICE NOTE, WORK STATUS LETTER, AND QUESTIONNAIRE THAT WAS SENT OVER FAXED BACK    [phone]

## 2022-04-05 ENCOUNTER — TELEPHONE (OUTPATIENT)
Dept: ORTHOPEDIC SURGERY | Facility: CLINIC | Age: 42
End: 2022-04-05

## 2022-04-05 NOTE — TELEPHONE ENCOUNTER
Provider: DM  Caller: MAKI (W/C )     PHONE: [phone]  Reason for Call: PT WORK COMP  IS ASKING TO FAX A COPY OF HIS PT ORDERS . [phone]     SHE IS ALSO ASKING FOR A WORK STATUS LETTER WITH OR WITH OUT RESTRICTIONS.

## 2022-04-07 ENCOUNTER — TELEPHONE (OUTPATIENT)
Dept: ORTHOPEDIC SURGERY | Facility: CLINIC | Age: 42
End: 2022-04-07

## 2022-04-07 NOTE — TELEPHONE ENCOUNTER
"    Caller: OLENA  Relationship:     Best call back number: [phone]    What form or medical record are you requesting: EMPLOYER IS ASKING FOR CLARIFICATION ON HIS RESTRICTIONS TO RETURN TO \"LIGHT DUTY. \"  NEED TO KNOW SPECIFICALLY WHAT HE CAN AND CAN NOT DO.    IF YOU HAVE ANY QUESTIONS PLEASE CALL MISS HYATT    Who is requesting this form or medical record from you: W/C    How would you like to receive the form or medical records (pick-up, mail, fax):   If fax, what is the fax number: [phone]    "

## 2022-04-14 ENCOUNTER — OFFICE VISIT (OUTPATIENT)
Dept: FAMILY MEDICINE CLINIC | Facility: CLINIC | Age: 42
End: 2022-04-14

## 2022-04-14 VITALS
WEIGHT: 257.1 LBS | RESPIRATION RATE: 18 BRPM | HEART RATE: 94 BPM | TEMPERATURE: 97.8 F | OXYGEN SATURATION: 96 % | BODY MASS INDEX: 31.97 KG/M2 | DIASTOLIC BLOOD PRESSURE: 84 MMHG | SYSTOLIC BLOOD PRESSURE: 124 MMHG | HEIGHT: 75 IN

## 2022-04-14 DIAGNOSIS — F41.0 PANIC ATTACK: ICD-10-CM

## 2022-04-14 DIAGNOSIS — F17.200 SMOKER: ICD-10-CM

## 2022-04-14 DIAGNOSIS — E78.2 MIXED HYPERLIPIDEMIA: ICD-10-CM

## 2022-04-14 DIAGNOSIS — F41.9 ANXIETY: Primary | ICD-10-CM

## 2022-04-14 PROCEDURE — 99214 OFFICE O/P EST MOD 30 MIN: CPT | Performed by: STUDENT IN AN ORGANIZED HEALTH CARE EDUCATION/TRAINING PROGRAM

## 2022-04-14 NOTE — PROGRESS NOTES
"Chief Complaint  Following up on anxiety/panic attacks    Subjective         Asif Rosario is a 42 y.o. male who presents to [facility]  42 years old male comes to the clinic today to follow-up on anxiety/panic attacks.    Patient was recently started on Effexor and clonazepam.  Patient reports significant improvement, currently able to focus on his work and lifestyle.  Denies any panic attacks/uncontrolled anxiety or depression.  Reports taking Effexor and clonazepam as prescribed.    Current smoker.    Hyperlipidemia; sedentary lifestyle    Review of Systems   Objective   Vital Signs:   Vitals:    04/14/22 1302   BP: 124/84   Pulse: 94   Resp: 18   Temp: 97.8 °F (36.6 °C)   SpO2: 96%   Weight: 117 kg (257 lb 1.6 oz)   Height: 190.5 cm (75\")      Body mass index is 32.14 kg/m².   Physical Exam  Vitals reviewed.   Constitutional:       Appearance: Normal appearance. He is well-developed.   HENT:      Head: Normocephalic and atraumatic.      Right Ear: External ear normal.      Left Ear: External ear normal.      Mouth/Throat:      Pharynx: No oropharyngeal exudate.   Eyes:      Conjunctiva/sclera: Conjunctivae normal.      Pupils: Pupils are equal, round, and reactive to light.   Cardiovascular:      Rate and Rhythm: Normal rate and regular rhythm.      Heart sounds: No murmur heard.    No friction rub. No gallop.   Pulmonary:      Effort: Pulmonary effort is normal.      Breath sounds: Normal breath sounds. No wheezing or rhonchi.   Abdominal:      General: Bowel sounds are normal. There is no distension.      Palpations: Abdomen is soft.      Tenderness: There is no abdominal tenderness.   Skin:     General: Skin is warm and dry.   Neurological:      Mental Status: He is alert and oriented to person, place, and time.      Cranial Nerves: No cranial nerve deficit.   Psychiatric:         Mood and Affect: Mood and affect normal.         Behavior: Behavior normal.         Thought " Content: Thought content normal.         Judgment: Judgment normal.               Asif Rosario  reports that he has been smoking. He has a 15.00 pack-year smoking history. He has never used smokeless tobacco.. I have educated him on the risk of diseases from using tobacco products such as cancer, COPD and heart disease.     I advised him to quit and he is not willing to quit.    I spent 3  minutes counseling the patient.              Assessment and Plan   Diagnoses and all orders for this visit:    1. Anxiety (Primary)  Comments:  Continue with Effexor and clonazepam as prescribed.    2. Panic attack    3. Mixed hyperlipidemia  Comments:  Daily exercise and healthy diet recommended    4. Smoker  Comments:  Smoking cessation discussed            Follow Up   Return in about 6 months (around 10/14/2022) for Recheck, Next scheduled follow up.  Patient was given instructions and counseling regarding his condition or for health maintenance advice. Please see specific information pulled into the AVS if appropriate.

## 2022-04-15 DIAGNOSIS — F41.0 PANIC ATTACK: ICD-10-CM

## 2022-04-15 DIAGNOSIS — F41.9 ANXIETY: ICD-10-CM

## 2022-04-15 RX ORDER — CLONAZEPAM 0.5 MG/1
TABLET ORAL
Qty: 30 TABLET | Refills: 0 | Status: SHIPPED | OUTPATIENT
Start: 2022-04-15 | End: 2022-05-13

## 2022-05-10 ENCOUNTER — TELEPHONE (OUTPATIENT)
Dept: ORTHOPEDIC SURGERY | Facility: CLINIC | Age: 42
End: 2022-05-10

## 2022-05-10 NOTE — TELEPHONE ENCOUNTER
Caller: OLENA    Relationship to patient: GABRIELA    Best call back number: [phone]    Patient is needing: OLENA CALLED TO INFORM OFFICE THAT PATIENT MAY ALSO RECEIVE TREATMENT FOR RIGHT SHOULDER - IF IT IS RELATED TO LEFT SHOULDER THEY WILL NOT COVER TRETAMENT    IF ANY QUESTIONS /CONCERNS PLEASE CALL W/C ADJUSTOR. TY

## 2022-05-13 DIAGNOSIS — F41.0 PANIC ATTACK: ICD-10-CM

## 2022-05-13 DIAGNOSIS — F41.9 ANXIETY: ICD-10-CM

## 2022-05-13 RX ORDER — CLONAZEPAM 0.5 MG/1
TABLET ORAL
Qty: 90 TABLET | Refills: 0 | Status: SHIPPED | OUTPATIENT
Start: 2022-05-13 | End: 2022-08-15

## 2022-06-02 ENCOUNTER — OFFICE VISIT (OUTPATIENT)
Dept: ORTHOPEDIC SURGERY | Facility: CLINIC | Age: 42
End: 2022-06-02

## 2022-06-02 VITALS — HEIGHT: 75 IN | BODY MASS INDEX: 31.08 KG/M2 | WEIGHT: 250 LBS

## 2022-06-02 DIAGNOSIS — S69.92XD INJURY OF LEFT WRIST, SUBSEQUENT ENCOUNTER: Primary | ICD-10-CM

## 2022-06-02 PROCEDURE — 99213 OFFICE O/P EST LOW 20 MIN: CPT | Performed by: PHYSICIAN ASSISTANT

## 2022-06-02 NOTE — PROGRESS NOTES
"Chief Complaint  Follow-up of the Left Wrist    Subjective          Asif Rosario is a 42 y.o. male  presents to [facility] for   History of Present Illness      Patient presents for follow-up evaluation of left wrist pain, left wrist injury, his original injury was 2/7/2022 is a work comp injury.  Patient has been doing physical therapy he states that they released him he states his wrist is \"a lot better \".  He states that he has had some right sided back pain into his shoulder blade he denies shoulder pain but points to his medial shoulder blade into his upper back as his area of worst pain.  He states the pain radiates into his neck, causes him to have a headache at times, pain with laying on his side.      No Known Allergies     Social History     Socioeconomic History   • Marital status: Legally    Tobacco Use   • Smoking status: Current Every Day Smoker     Packs/day: 1.00     Years: 15.00     Pack years: 15.00   • Smokeless tobacco: Never Used   Vaping Use   • Vaping Use: Never used   Substance and Sexual Activity   • Alcohol use: Not Currently   • Drug use: Never   • Sexual activity: Yes     Partners: Female     Birth control/protection: Condom        REVIEW OF SYSTEMS    Constitutional: Denies fevers, chills, weight loss  Cardiovascular: Denies chest pain, shortness of breath  Skin: Denies rashes, acute skin changes  Neurologic: Denies headache, loss of consciousness  MSK: Left wrist pain      Objective   Vital Signs:   Ht 190.5 cm (75\")   Wt 113 kg (250 lb)   BMI 31.25 kg/m²     Body mass index is 31.25 kg/m².    Physical Exam    Left wrist: Nontender to palpation, no pain with range of motion, 5 out of 5 strength, full range of motion with flexion, extension, ulnar and radial deviation, full supination and pronation, neurovascularly intact.    Procedures    Imaging Results (Most Recent)     None           Result Review :   The following data was reviewed by: " CHRISTY Burrell on 06/02/2022:               Assessment and Plan    Diagnoses and all orders for this visit:    1. Injury of left wrist, subsequent encounter (Primary)        Discussed diagnosis and treatment options with the patient he was advised to follow-up as needed for the wrist, based on his symptoms he was advised that he could have x-rays of his shoulder but he states he does not have shoulder pain he points more to his mid upper back as his area of pain, based on his symptoms I recommended that he should follow-up with his primary care physician for evaluation of cervical/thoracic spine with possible MRI and x-rays and he agreed.  Follow-up as needed for the wrist.  Patient agreed.  He may follow-up at any time if he does decide it is right shoulder pain    Call or return if worsening symptoms.    Follow Up   Return if symptoms worsen or fail to improve, for Recheck.  Patient was given instructions and counseling regarding his condition or for health maintenance advice. Please see specific information pulled into the AVS if appropriate.

## 2022-06-13 ENCOUNTER — TELEPHONE (OUTPATIENT)
Dept: FAMILY MEDICINE CLINIC | Facility: CLINIC | Age: 42
End: 2022-06-13

## 2022-06-13 NOTE — TELEPHONE ENCOUNTER
Patient is asking for a referral for a MRI of his neck/ back. Advice patient that Dr Mckeon is out of office for the next two weeks and he will address the referral when he returns. Patient was understanding.

## 2022-06-17 ENCOUNTER — OFFICE VISIT (OUTPATIENT)
Dept: ORTHOPEDIC SURGERY | Facility: CLINIC | Age: 42
End: 2022-06-17

## 2022-06-17 VITALS — BODY MASS INDEX: 33.47 KG/M2 | OXYGEN SATURATION: 97 % | HEART RATE: 116 BPM | HEIGHT: 75 IN | WEIGHT: 269.2 LBS

## 2022-06-17 DIAGNOSIS — S69.92XD INJURY OF LEFT WRIST, SUBSEQUENT ENCOUNTER: Primary | ICD-10-CM

## 2022-06-17 PROCEDURE — 99213 OFFICE O/P EST LOW 20 MIN: CPT | Performed by: PHYSICIAN ASSISTANT

## 2022-06-17 NOTE — PROGRESS NOTES
"Chief Complaint  Pain and Follow-up of the Left Wrist    Subjective          Asif Rosario is a 42 y.o. male  presents to [facility] for   History of Present Illness      Patient presents for follow-up evaluation of left wrist pain, left wrist injury, original injury was 2/7/2022 this is a Workmen's Comp. case.  Patient at last visit stated that his wrist was doing a lot better \".  He states that his wrist started hurting him worse over the last 2 weeks he states that wrist pain happens when he leans on his wrist or tries to push up from going from seated to standing position he points to the dorsum of the wrist as his area of worst pain he states he still has good range of motion and strength.  Denies numbness and tingling.  He never had an MRI of the wrist.      No Known Allergies     Social History     Socioeconomic History   • Marital status: Legally    Tobacco Use   • Smoking status: Current Every Day Smoker     Packs/day: 1.00     Years: 15.00     Pack years: 15.00   • Smokeless tobacco: Never Used   Vaping Use   • Vaping Use: Never used   Substance and Sexual Activity   • Alcohol use: Not Currently   • Drug use: Never   • Sexual activity: Yes     Partners: Female     Birth control/protection: Condom        REVIEW OF SYSTEMS    Constitutional: Denies fevers, chills, weight loss  Cardiovascular: Denies chest pain, shortness of breath  Skin: Denies rashes, acute skin changes  Neurologic: Denies headache, loss of consciousness  MSK: Left wrist pain      Objective   Vital Signs:   Pulse 116   Ht 189.2 cm (74.5\")   Wt 122 kg (269 lb 3.2 oz)   SpO2 97%   BMI 34.10 kg/m²     Body mass index is 34.1 kg/m².    Physical Exam    Left wrist: Nontender to palpation, no pain with range of motion, full flexion, full extension, full ulnar and radial deviation, full supination and pronation, no pain with resisted range of motion, neurovascularly intact.  Patient able to perform full " prayer stretch and reverse prayer stretch without pain.    Procedures    Imaging Results (Most Recent)     None           Result Review :   The following data was reviewed by: CHRISTY Burrell on 06/17/2022:               Assessment and Plan    Diagnoses and all orders for this visit:    1. Injury of left wrist, subsequent encounter (Primary)  -     MRI Wrist Left Without Contrast; Future        Discussed diagnosis and treatment options with the patient patient was advised we will order MRI of the left wrist he was given work note follow-up after MRI.    Call or return if worsening symptoms.    Follow Up   Return for After MRI.  Patient was given instructions and counseling regarding his condition or for health maintenance advice. Please see specific information pulled into the AVS if appropriate.

## 2022-07-14 ENCOUNTER — OFFICE VISIT (OUTPATIENT)
Dept: FAMILY MEDICINE CLINIC | Facility: CLINIC | Age: 42
End: 2022-07-14

## 2022-07-14 VITALS
HEIGHT: 75 IN | RESPIRATION RATE: 23 BRPM | SYSTOLIC BLOOD PRESSURE: 140 MMHG | TEMPERATURE: 97.8 F | BODY MASS INDEX: 32.2 KG/M2 | DIASTOLIC BLOOD PRESSURE: 89 MMHG | HEART RATE: 99 BPM | WEIGHT: 259 LBS | OXYGEN SATURATION: 97 %

## 2022-07-14 DIAGNOSIS — M54.2 CHRONIC NECK PAIN: Primary | ICD-10-CM

## 2022-07-14 DIAGNOSIS — G89.29 CHRONIC NECK PAIN: Primary | ICD-10-CM

## 2022-07-14 DIAGNOSIS — R20.2 NUMBNESS AND TINGLING OF RIGHT ARM: ICD-10-CM

## 2022-07-14 DIAGNOSIS — R20.0 NUMBNESS AND TINGLING OF RIGHT ARM: ICD-10-CM

## 2022-07-14 PROCEDURE — 99213 OFFICE O/P EST LOW 20 MIN: CPT | Performed by: STUDENT IN AN ORGANIZED HEALTH CARE EDUCATION/TRAINING PROGRAM

## 2022-07-14 NOTE — PROGRESS NOTES
"Chief Complaint    Neck pain radiating to right upper extremity/shoulder  Subjective         Asif Rosario is a 42 y.o. male who presents to [facility]  42 years old male comes to the clinic today to follow-up on neck pain.    Patient had work-related injury in February since then he has been complaining of intermittent neck pain and occasionally radiating to right upper extremity.  Patient reports mild tingling and numbness with those pain episodes on right extremity without any significant weakness.  Denies any vision changes/hearing changes but does report mild headaches with pain due to neck pain.  Denies any fever/recent trauma or any other symptoms.  Patient is requesting to get MRI done as he just wants to make sure that he is not having any nerve compression.    Review of Systems   Objective   Vital Signs:   Vitals:    07/14/22 1504   BP: 140/89   Pulse: 99   Resp: 23   Temp: 97.8 °F (36.6 °C)   SpO2: 97%   Weight: 117 kg (259 lb)   Height: 190.5 cm (75\")      Body mass index is 32.37 kg/m².   Physical Exam  Neck:      Comments: Right upper extremity exam is normal without any sensory/motor deficit.  Strength intact  Musculoskeletal:      Cervical back: Normal range of motion. No edema or erythema.                       Assessment and Plan   Diagnoses and all orders for this visit:    1. Chronic neck pain (Primary)  -     MRI Lumbar Spine Without Contrast; Future    2. Numbness and tingling of right arm  -     MRI Lumbar Spine Without Contrast; Future        Benign physical exam, as per patient's request and history of his symptoms-I will go ahead and order MRI to rule out any nerve compression and further findings.  I have offered muscle relaxant to the patient; declined, usually ibuprofen helps with the symptoms as per patient.      Follow Up   Return in about 3 months (around 10/14/2022) for Next scheduled follow up.  Patient was given instructions and counseling " regarding his condition or for health maintenance advice. Please see specific information pulled into the AVS if appropriate.

## 2022-07-20 ENCOUNTER — APPOINTMENT (OUTPATIENT)
Dept: MRI IMAGING | Facility: HOSPITAL | Age: 42
End: 2022-07-20

## 2022-07-27 ENCOUNTER — TELEPHONE (OUTPATIENT)
Dept: ORTHOPEDIC SURGERY | Facility: CLINIC | Age: 42
End: 2022-07-27

## 2022-07-27 NOTE — TELEPHONE ENCOUNTER
Caller: Asif Rosario    Relationship to patient: Self    Best call back number:     Patient is needing: PATIENT IS NEEDING THE WORK NOTE THAT WAS STATED  HE COULD NOT GO BACK TO WORK UNTIL MRI WAS REVIEWED FAXED  [phone] PLEASE AND THANK YOU!

## 2022-08-01 ENCOUNTER — TELEPHONE (OUTPATIENT)
Dept: FAMILY MEDICINE CLINIC | Facility: CLINIC | Age: 42
End: 2022-08-01

## 2022-08-01 NOTE — TELEPHONE ENCOUNTER
Alka from [facility] was calling to get order and medical records about patients MRI lumbar spine. MRI for left wrist was approved but the other was not. Patient has MRI lumbar spine schedules for Friday. They need order and records faxed over to Tala @ [phone]  so they are able to approve it before Thursday at 3pm.

## 2022-08-05 ENCOUNTER — APPOINTMENT (OUTPATIENT)
Dept: MRI IMAGING | Facility: HOSPITAL | Age: 42
End: 2022-08-05

## 2022-08-09 ENCOUNTER — HOSPITAL ENCOUNTER (OUTPATIENT)
Dept: MRI IMAGING | Facility: HOSPITAL | Age: 42
Discharge: HOME | End: 2022-08-09
Admitting: PHYSICIAN ASSISTANT

## 2022-08-09 DIAGNOSIS — S69.92XD INJURY OF LEFT WRIST, SUBSEQUENT ENCOUNTER: ICD-10-CM

## 2022-08-09 PROCEDURE — 73221 MRI JOINT UPR EXTREM W/O DYE: CPT

## 2022-08-11 ENCOUNTER — OFFICE VISIT (OUTPATIENT)
Dept: ORTHOPEDIC SURGERY | Facility: CLINIC | Age: 42
End: 2022-08-11

## 2022-08-11 VITALS — WEIGHT: 259 LBS | HEART RATE: 89 BPM | OXYGEN SATURATION: 97 % | HEIGHT: 75 IN | BODY MASS INDEX: 32.2 KG/M2

## 2022-08-11 DIAGNOSIS — M25.432 LEFT WRIST EFFUSION: ICD-10-CM

## 2022-08-11 DIAGNOSIS — S69.92XD INJURY OF LEFT WRIST, SUBSEQUENT ENCOUNTER: Primary | ICD-10-CM

## 2022-08-11 PROCEDURE — 99213 OFFICE O/P EST LOW 20 MIN: CPT | Performed by: ORTHOPAEDIC SURGERY

## 2022-08-11 NOTE — PROGRESS NOTES
"Chief Complaint  Pain and Follow-up of the Left Wrist     Subjective      Asif Rosario presents to [facility] for follow up evaluation of the left wrist. The patient recently had an MRI and is here today for those results. To review, His initial original injury was 2/7/2022 this is a Workmen's Comp. case. He states that wrist pain happens when he leans on his wrist or tries to push up from going from seated to standing position he points to the dorsum of the wrist as his area of worst pain.     No Known Allergies     Social History     Socioeconomic History   • Marital status: Legally    Tobacco Use   • Smoking status: Current Every Day Smoker     Packs/day: 1.50     Years: 15.00     Pack years: 22.50   • Smokeless tobacco: Never Used   Vaping Use   • Vaping Use: Never used   Substance and Sexual Activity   • Alcohol use: Not Currently   • Drug use: Never   • Sexual activity: Yes     Partners: Female     Birth control/protection: Condom        Review of Systems     Objective   Vital Signs:   Pulse 89   Ht 190.5 cm (75\")   Wt 117 kg (259 lb)   SpO2 97%   BMI 32.37 kg/m²       Physical Exam  Constitutional:       Appearance: Normal appearance. The patient is well-developed and normal weight.   HENT:      Head: Normocephalic.      Right Ear: Hearing and external ear normal.      Left Ear: Hearing and external ear normal.      Nose: Nose normal.   Eyes:      Conjunctiva/sclera: Conjunctivae normal.   Cardiovascular:      Rate and Rhythm: Normal rate.   Pulmonary:      Effort: Pulmonary effort is normal.      Breath sounds: No wheezing or rales.   Abdominal:      Palpations: Abdomen is soft.      Tenderness: There is no abdominal tenderness.   Musculoskeletal:      Cervical back: Normal range of motion.   Skin:     Findings: No rash.   Neurological:      Mental Status: The patient is alert and oriented to person, place, and time.   Psychiatric:         Mood and Affect: Mood " and affect normal.         Judgment: Judgment normal.       Ortho Exam      Left wrist- Tender to the left wrist over the radial carpal and DRUJ region. Supination -5. Pronation -5. Wrist extension 50, flexion 65. Mild swelling. No redness. Skin intact. Sensation grossly intact.     Procedures    Imaging Results (Most Recent)     None           Result Review :       MRI Wrist Left Without Contrast    Result Date: 8/10/2022  Narrative: PROCEDURE: MRI WRIST LEFT WO CONTRAST  COMPARISON: None  INDICATIONS: MEDIAL LEFT WRIST PAIN, SWELLING, AND NUMBNESS. HISTORY OF WRIST FRACTURE FROM FALL 6 MONTHS AGO, NO RECENT INJURY. DECREASED STRENGTH IN WRIST.      TECHNIQUE: A complete multi-planar MRI of the wrist was performed.  FINDINGS:  The triangular fibrocartilage complex is grossly intact. The components of the scapholunate ligament are intact.  The articulations of the wrist are grossly intact.  A joint effusion is noted at the distal radial ulnar joint.  This finding may be reactive in nature given the prior injury.  Changes secondary to developing inflammatory arthropathy could potentially have this appearance as well.  No definitive erosive changes are identified.  Note is made of mild articular cartilage loss, joint space narrowing, subchondral edema, and subchondral sclerosis throughout the articulations of the wrist.  The findings suggest mild changes of osteoarthritis.  There is no definitive evidence for pannus formation throughout the joint space.  There is no additional abnormal bone marrow signal.  There is no evidence for contusion or fracture.  The cortical margins are grossly intact.  The flexor and extensor tendons overlying the wrist are grossly intact. No abnormal fluid collections are seen within the surrounding soft tissues.      Impression:   1. No evidence for ligament or tendon derangement. 2. There is a joint effusion of the distal radial ulnar joint.  This finding may be reactive in nature given  the recent injury.  Changes secondary to developing inflammatory arthropathy could potentially have this appearance.  No definitive erosive changes are seen at this time.  There is no evidence for pannus formation throughout the articulations of the wrist.  No additional definitive changes of synovitis are observed. 3. 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 4. No evidence for fracture or contusion.        EDGAR LOPEZ MD       Electronically Signed and Approved By: EDGAR LOPEZ MD on 8/10/2022 at 8:40                      Assessment and Plan     Diagnoses and all orders for this visit:    1. Injury of left wrist, subsequent encounter (Primary)    2. Left wrist effusion        Discussed the treatment plan with the patient.  I reviewed the MRI with the patient. Plan to continue ibuprofen as needed. Plan for activity as tolerated.     Call or return if worsening symptoms.    Follow Up     PRN      Patient was given instructions and counseling regarding his condition or for health maintenance advice. Please see specific information pulled into the AVS if appropriate.     Scribed for Conrad Sood MD by Collette Perez.  08/11/22   16:03 EDT    I have personally performed the services described in this document as scribed by the above individual and it is both accurate and complete. Conrad Sood MD 08/11/22

## 2022-08-14 DIAGNOSIS — F41.9 ANXIETY: ICD-10-CM

## 2022-08-14 DIAGNOSIS — F41.0 PANIC ATTACK: ICD-10-CM

## 2022-08-15 ENCOUNTER — TELEPHONE (OUTPATIENT)
Dept: ORTHOPEDIC SURGERY | Facility: CLINIC | Age: 42
End: 2022-08-15

## 2022-08-15 DIAGNOSIS — F41.9 ANXIETY: ICD-10-CM

## 2022-08-15 DIAGNOSIS — F41.0 PANIC ATTACK: ICD-10-CM

## 2022-08-15 DIAGNOSIS — S69.92XD INJURY OF LEFT WRIST, SUBSEQUENT ENCOUNTER: ICD-10-CM

## 2022-08-15 RX ORDER — HYDROXYZINE HYDROCHLORIDE 25 MG/1
25 TABLET, FILM COATED ORAL 3 TIMES DAILY PRN
Qty: 30 TABLET | Refills: 1 | Status: SHIPPED | OUTPATIENT
Start: 2022-08-15

## 2022-08-15 RX ORDER — CLONAZEPAM 0.5 MG/1
TABLET ORAL
Qty: 90 TABLET | OUTPATIENT
Start: 2022-08-15

## 2022-08-15 RX ORDER — IBUPROFEN 800 MG/1
800 TABLET ORAL EVERY 8 HOURS PRN
Qty: 90 TABLET | Refills: 1 | Status: SHIPPED | OUTPATIENT
Start: 2022-08-15

## 2022-08-15 RX ORDER — CLONAZEPAM 0.5 MG/1
0.5 TABLET ORAL NIGHTLY PRN
Qty: 90 TABLET | Refills: 0 | OUTPATIENT
Start: 2022-08-15

## 2022-08-15 NOTE — TELEPHONE ENCOUNTER
CALLED PATIENT TO INFORM HIM IF WE DO R/S HE WOULD HAVE TO MAKE AN APPT. TO F/U. HE SAID LETS WAIT ON IT THEN.

## 2022-08-15 NOTE — TELEPHONE ENCOUNTER
Caller: Asif Rosario    Relationship to patient: Self    Best call back number:[phone]  Patient is needing:PATIENT IS NEEDING A WORK NOTE WITH RESTRICTIONS FOR THE LEFT HAND. REQUESTING IT BE FAXED  [phone]. PATIENT IS STATING THAT IT NEEDS TO SAY NO REPETITION ON THAT HAND. THANK YOU!

## 2022-08-15 NOTE — TELEPHONE ENCOUNTER
Caller: OLENA    Relationship: MARTIN    Best call back number: [phone]    What form or medical record are you requesting: OFFICE NOTES AND WORK STATUS FROM 08.11.22. MRI REPORT OF LEFT WRIST FROM 08.09.22.     Who is requesting this form or medical record from you: OLENA     How would you like to receive the form or medical records (pick-up, mail, fax):   If fax, what is the fax number:  [phone]    Timeframe paperwork needed: ASAP    Additional notes: OLENA WITH [employer] WAS CALLING TO REQUEST THE OFFICE NOTES AND WORK STATUS FROM 08.11.22. AS WELL AS, THE MRI REPORT OF LEFT WRIST FROM 08.09.22. OLENA IS REQUESTING EVERYTHING TO BE FAXED OVER [phone] ASAP. THANK YOU!

## 2022-08-15 NOTE — TELEPHONE ENCOUNTER
Caller: Asif Rosario    Relationship: Self    Best call back number: [phone]     Requested Prescriptions:   Requested Prescriptions     Pending Prescriptions Disp Refills   • clonazePAM (KlonoPIN) 0.5 MG tablet 90 tablet 0     Sig: Take 1 tablet by mouth At Night As Needed.   • hydrOXYzine (ATARAX) 25 MG tablet 30 tablet 1     Sig: Take 1 tablet by mouth 3 (Three) Times a Day As Needed for Anxiety.   • ibuprofen (ADVIL,MOTRIN) 800 MG tablet 90 tablet 1     Sig: Take 1 tablet by mouth Every 8 (Eight) Hours As Needed for Mild Pain .        Pharmacy where request should be sent: [id number] [address] AT [facility] [place] - [phone] [facility] [phone]     Additional details provided by patient:     Does the patient have less than a 3 day supply:  [x] Yes  [] No    Darin LANZA Rep   08/15/22 10:52 EDT

## 2022-08-16 RX ORDER — CLONAZEPAM 0.5 MG/1
TABLET ORAL
Qty: 90 TABLET | Refills: 0 | Status: SHIPPED | OUTPATIENT
Start: 2022-08-16 | End: 2022-11-11

## 2022-11-11 DIAGNOSIS — F41.9 ANXIETY: ICD-10-CM

## 2022-11-11 DIAGNOSIS — F41.0 PANIC ATTACK: ICD-10-CM

## 2022-11-11 RX ORDER — CLONAZEPAM 0.5 MG/1
TABLET ORAL
Qty: 90 TABLET | Refills: 0 | Status: SHIPPED | OUTPATIENT
Start: 2022-11-11

## 2023-05-12 NOTE — TELEPHONE ENCOUNTER
LOV 02/18/22  NOV 03/29/22   POST-CRYOTHERAPY INSTRUCTIONS  (AKA LIQUID NITROGEN TREATMENT)    Your skin has been treated with liquid nitrogen, also known as cryotherapy.   This creates a superficial burn, like frostbite to the skin, which should destroy the underlying lesion.   You can expect the area to be pink and inflamed for about 48 hours.   Around day 3-4 after treatment the treated site will darken, forms a scab or blister, and then slough away.   KEEP VASELINE PETROLEUM JELLY OR AQUAPHOR on the site at all times during this phase, which speeds the healing process.   Use gentle soap and water to cleanse the skin. Do not pick at any scabs.   Treated skin usually heals within 1-3 weeks. It may be pink for up to 3 months, and excess pigment is often removed, so the treated areas may be lighter in color than the surrounding skin.